# Patient Record
Sex: FEMALE | Race: WHITE | NOT HISPANIC OR LATINO | Employment: PART TIME | URBAN - METROPOLITAN AREA
[De-identification: names, ages, dates, MRNs, and addresses within clinical notes are randomized per-mention and may not be internally consistent; named-entity substitution may affect disease eponyms.]

---

## 2017-05-12 PROBLEM — Q51.3 BICORNATE UTERUS: Status: ACTIVE | Noted: 2017-05-12

## 2017-05-12 PROBLEM — Z34.90 PREGNANCY: Status: ACTIVE | Noted: 2017-05-12

## 2017-05-12 PROBLEM — Z67.10 TYPE A BLOOD, RH POSITIVE: Status: ACTIVE | Noted: 2017-05-12

## 2020-02-20 ENCOUNTER — OFFICE VISIT (OUTPATIENT)
Dept: URGENT CARE | Facility: CLINIC | Age: 30
End: 2020-02-20
Payer: MEDICAID

## 2020-02-20 VITALS
WEIGHT: 145 LBS | SYSTOLIC BLOOD PRESSURE: 152 MMHG | BODY MASS INDEX: 26.68 KG/M2 | OXYGEN SATURATION: 98 % | DIASTOLIC BLOOD PRESSURE: 82 MMHG | HEIGHT: 62 IN | HEART RATE: 107 BPM | TEMPERATURE: 98 F

## 2020-02-20 DIAGNOSIS — S93.401A SPRAIN OF RIGHT ANKLE, UNSPECIFIED LIGAMENT, INITIAL ENCOUNTER: ICD-10-CM

## 2020-02-20 DIAGNOSIS — F17.200 SMOKER: ICD-10-CM

## 2020-02-20 DIAGNOSIS — S99.911A INJURY OF RIGHT ANKLE, INITIAL ENCOUNTER: Primary | ICD-10-CM

## 2020-02-20 PROCEDURE — 99204 PR OFFICE/OUTPT VISIT, NEW, LEVL IV, 45-59 MIN: ICD-10-PCS | Mod: S$GLB,,, | Performed by: FAMILY MEDICINE

## 2020-02-20 PROCEDURE — 73630 X-RAY EXAM OF FOOT: CPT | Mod: RT,S$GLB,, | Performed by: RADIOLOGY

## 2020-02-20 PROCEDURE — 73630 XR FOOT COMPLETE 3 VIEW RIGHT: ICD-10-PCS | Mod: RT,S$GLB,, | Performed by: RADIOLOGY

## 2020-02-20 PROCEDURE — 99204 OFFICE O/P NEW MOD 45 MIN: CPT | Mod: S$GLB,,, | Performed by: FAMILY MEDICINE

## 2020-02-20 PROCEDURE — 73610 X-RAY EXAM OF ANKLE: CPT | Mod: RT,S$GLB,, | Performed by: RADIOLOGY

## 2020-02-20 PROCEDURE — 73610 XR ANKLE COMPLETE 3 VIEW RIGHT: ICD-10-PCS | Mod: RT,S$GLB,, | Performed by: RADIOLOGY

## 2020-02-20 RX ORDER — NAPROXEN 375 MG/1
375 TABLET ORAL 2 TIMES DAILY
Qty: 20 TABLET | Refills: 0 | Status: ON HOLD | OUTPATIENT
Start: 2020-02-20 | End: 2023-01-30 | Stop reason: HOSPADM

## 2020-02-20 RX ORDER — TRAMADOL HYDROCHLORIDE 50 MG/1
50 TABLET ORAL EVERY 6 HOURS PRN
Qty: 20 TABLET | Refills: 0 | Status: SHIPPED | OUTPATIENT
Start: 2020-02-20 | End: 2021-02-19

## 2020-02-20 NOTE — LETTER
February 20, 2020  Katelyn Humphrey  [address] [facility] [place] [address]                [facility]  [address], [address]  [facility] [address]  Phone: [phone]  Fax: [phone] Katelyn Humphrey was seen and treated in our Urgent Care department on 2/20/2020. She may return to work in 2 - 3 days.      If you have any questions or concerns, please don't hesitate to call.        Sincerely,        Juanpablo Fuchs MD
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
## 2020-02-20 NOTE — PATIENT INSTRUCTIONS
Please drink plenty of fluids.  Please get plenty of rest.  Please return here or go to the Emergency Department for any concerns or worsening of condition.  If you were prescribed a narcotic medication, do not drive or operate heavy equipment or machinery while taking these medications.  If you were not prescribed an anti-inflammatory medication, and if you do not have any history of stomach/intestinal ulcers, or kidney disease, or are not taking a blood thinner such as Coumadin, Plavix, Pradaxa, Eloquis, or Xaralta for example, it is OK to take over the counter Ibuprofen or Advil or Motrin or Aleve as directed.  Do not take these medications on an empty stomach.  Rest, ice, compression and elevation to the affected joint or limb as needed.    If you were given or placed in a brace, wear it until your follow up visit or recheck.  If you  smoke, please stop smoking.       If you were instructed to use crutches, use them for all ambulation.  You may walk on the injured leg with the crutches.  Use them to keep some of your weight off the injured leg until your follow up visit.    Please follow up with your primary care doctor or specialist as needed.    Primary Doctor No  None    You must understand that you have received treatment at an Urgent Care facility only, and that you may be  released before all of your medical problems are known or treated. Urgent Care facilities are not equipped to  handle life threatening emergencies. It is recommended that you seek care at an Emergency Department for  further evaluation of worsening or concerning symptoms, or possibly life threatening conditions as  discussed.      Ankle Sprain, with X-Ray   A sprain is an injury to the ligaments that hold the ankle joint together. There are no broken bones. Most sprains take about 4 to 6 weeks to heal. A severe sprain, where the ligament is completely torn, can take several months to recover.  Mild to moderate sprains may be treated with  an elastic wrap or an in-shoe splint. This will provide support and prevent re-injury. A mild sprain may not need any additional support. A severe sprain may require surgery to repair. In some cases a cast or boot may be needed.  Home care  · Stay off your injured ankle as much as possible until you can walk on it without pain. If you have a lot of pain with walking, then crutches or a walker may be prescribed. These can be rented or purchased at many pharmacies and surgical or orthopedic supply stores. Follow your healthcare providers advice about when to begin bearing weight on that leg.  · Keep your leg elevated to reduce pain and swelling. When sleeping, place a pillow under your injured ankle. When sitting, support your injured ankle and leg so they are level with your waist. This is very important during the first 48 hours.  · Place an ice pack over the injured area for no more than 20 minutes. Do this every 3 to 6 hours for the first 24 to 48 hours, or as instructed. To make an ice pack, put ice cubes in a plastic bag that seals at the top. Wrap the bag in a clean, thin towel or cloth. You can place the ice pack directly over the wrap, splint, or cast. Never place an ice pack directly on your skin. As the ice melts, be careful not to get any wrap, splint, or cast wet. Keep using ice packs as needed to ease pain and swelling.    · If you have a boot, open it to apply an ice pack, unless told otherwise by your provider. Wrap the ice pack in a clean, thin towel or cloth. Never put an ice pack directly on your skin.  · After 48 hours, it may also be helpful to apply heat for no more than 20 minutes, several times a day. You can do this with a heating pad or warm compress. Or you may want to go back and forth between using ice and heat. Never apply heat directly to the skin. Always wrap the heating pad or warm compress in a clean, thin towel or cloth.  · You may use over-the-counter pain medicine to ease pain,  unless another pain medicine was prescribed. Talk with your provider before using these medicines if you have chronic liver or kidney disease, or have ever had a stomach ulcer or GI (gastrointestinal) bleeding.  · You may return to sports after your ankle heals, when you can run without pain.  · You are at risk of getting injured again for the first 6 weeks. During that time, protect your ankle with an in-shoe splint that prevents your ankle from tilting side to side. This is very important if you do active work or play sports during that time.  Follow-up care  Any X-rays you had today dont show any broken bones, breaks, or fractures. Bruises and sprains can sometimes hurt as much as a fracture. These injuries can take time to heal completely. If your symptoms dont improve or they get worse, talk with your healthcare provider. You may need a repeat X-ray.  When to seek medical advice  Call your healthcare provider right away if any of these occur:  · The plaster cast or splint gets wet or soft  · The fiberglass cast or splint gets wet and does not dry for 24 hours  · The pain or swelling increases, or redness appears  · The cast has a bad smell  · Fever of 100.4 F (38 C) or higher, or as directed  · Your toes become cold, blue, numb, or tingly  · You re-injure your ankle  Date Last Reviewed: 11/20/2015  © 7838-9088 [employer]. [address] [place] [address]. All rights reserved. This information is not intended as a substitute for professional medical care. Always follow your healthcare professional's instructions.      Understanding Ankle Sprain    The ankle is the joint where the leg and foot meet. Bones are held in place by connective tissue called ligaments. When ankle ligaments are stretched to the point of pain and injury, it is called an ankle sprain. A sprain can tear the ligaments. These tears can be very small but still cause pain. Ankle sprains can be mild or severe.  What  causes an ankle sprain?  A sprain may occur when you twist your ankle or bend it too far. This can happen when you stumble or fall. Things that can make an ankle sprain more likely include:  · Having had an ankle sprain before  · Playing sports that involve running and jumping. Or playing contact sports such as football or hockey.  · Wearing shoes that dont support your feet and ankles well  · Having ankles with poor strength and flexibility  Symptoms of an ankle sprain  Symptoms may include:  · Pain or soreness in the ankle  · Swelling  · Redness or bruising  · Not being able to walk or put weight on the affected foot  · Reduced range of motion in the ankle  · A popping or tearing feeling at the time the sprain occurs  · An abnormal or dislocated look to the ankle  · Instability or too much range of motion in the ankle  Treatment for an ankle sprain  Treatment focuses on reducing pain and swelling, and avoiding further injury. Treatments may include:  · Resting the ankle. Avoid putting weight on it. This may mean using crutches until the sprain heals.  · Prescription or over-the-counter pain medicines. These help reduce swelling and pain.  · Cold packs. These help reduce pain and swelling.  · Raising your ankle above your heart. This helps reduce swelling.  · Wrapping the ankle with an elastic bandage or ankle brace. This helps reduce swelling and gives some support to the ankle. In rare cases, you may need a cast or boot.  · Stretching and other exercises. These improve flexibility and strength.  · Heat packs. These may be recommended before doing ankle exercises.  Possible complications of an ankle sprain  An ankle that has been weakened by a sprain can be more likely to have repeated sprains afterward. Doing exercises to strengthen your ankle and improve balance can reduce your risk for repeated sprains. Other possible complications are long-term (chronic) pain or an ankle that remains unstable.  When to call  your healthcare provider  Call your healthcare provider right away if you have any of these:  · Fever of 100.4°F (38°C) or higher, or as directed  · Pain, numbness, discoloration, or coldness in the foot or toes  · Pain that gets worse  · Symptoms that dont get better, or get worse  · New symptoms   Date Last Reviewed: 3/10/2016  © 1554-7502 [employer]. [address] [place] [address], [place] [address]. All rights reserved. This information is not intended as a substitute for professional medical care. Always follow your healthcare professional's instructions.      Discharge Instructions: Using Crutches (Weight-Bearing)  Your healthcare provider has prescribed crutches for you. A healthy leg can support your body weight, but when you have an injured leg or foot, you need to keep weight off it. Once you are told that you can put some weight on your leg, use a weight-bearing method of walking as the leg heals. Depending on your arm strength and balance, you can either step to or step through. Practice will help you learn to step through so that you can cover more ground with each step.      Before you use crutches  Be prepared with the following tips:  · Remove throw rugs, electrical cords, and anything else that may cause you to fall.  · Arrange your household to keep the items you need handy. Keep everything else out of the way.  · Find a backpack, jyotsna pack, or apron, or use pockets to carry things. This will help you keep your hands free.  Standing with crutches  Use the balanced standing (tripod) position when you start or end a movement. Also use it whenever you're standing for a length of time.  · Move your crutches in front of you about 12 inches.  · Find your balance.  · Be sure not to rest your armpits on the pads.  Walking with crutches  Follow these tips:  · Start in a balanced standing (tripod) position.  · Step forward with your affected foot.  · Land lightly between your  crutches.  · Squeeze the pads against the sides of your chest.  · Support your weight with your hands and your affected leg.  · Press down on the handgrips.  Step to  This method includes the following:  · Lift your unaffected foot and step to the crutches.  · Land on your unaffected foot, between your crutches.  · Keep the knee slightly bent.  · Reach forward and out with the crutches to begin the next step.  Step through  This method includes the following:  · Lift the unaffected foot.  · Step forward through the crutches.  · Land on the unaffected foot, with the heel slightly in front of the toe of the other foot.  · Keep the knee slightly bent.  · Reach forward and out with the crutches to begin the next step.  Follow-up  Make a follow-up appointment as directed by your healthcare provider.     When to call your healthcare provider  Call your healthcare provider right away if you have any of the following:  · Sudden or increased shortness of breath  · Sudden chest pain or localized chest pain with coughing  · Fever above 100.4°F (38.0°C)  · Increasing redness, tenderness, or swelling at theincision site or in the injured limb  · Drainage from the incision or injured limb  · Opening of the incision or injury  · Increasing pain, with or without activity   Date Last Reviewed: 8/1/2016  © 8540-7167 [email or web address]. [address] [place] [address], [place] [address]. All rights reserved. This information is not intended as a substitute for professional medical care. Always follow your healthcare professional's instructions.

## 2020-07-15 PROBLEM — Z30.2 ENCOUNTER FOR FEMALE STERILIZATION PROCEDURE: Status: ACTIVE | Noted: 2020-07-15

## 2020-11-18 ENCOUNTER — OFFICE VISIT (OUTPATIENT)
Dept: URGENT CARE | Facility: CLINIC | Age: 30
End: 2020-11-18
Payer: MEDICAID

## 2020-11-18 VITALS
BODY MASS INDEX: 26.68 KG/M2 | RESPIRATION RATE: 20 BRPM | SYSTOLIC BLOOD PRESSURE: 115 MMHG | DIASTOLIC BLOOD PRESSURE: 78 MMHG | WEIGHT: 145 LBS | TEMPERATURE: 99 F | OXYGEN SATURATION: 100 % | HEIGHT: 62 IN | HEART RATE: 70 BPM

## 2020-11-18 DIAGNOSIS — H66.003 ACUTE SUPPURATIVE OTITIS MEDIA OF BOTH EARS WITHOUT SPONTANEOUS RUPTURE OF TYMPANIC MEMBRANES, RECURRENCE NOT SPECIFIED: Primary | ICD-10-CM

## 2020-11-18 DIAGNOSIS — J06.9 ACUTE URI: ICD-10-CM

## 2020-11-18 DIAGNOSIS — Z72.0 TOBACCO ABUSE: ICD-10-CM

## 2020-11-18 PROCEDURE — 99214 OFFICE O/P EST MOD 30 MIN: CPT | Mod: S$GLB,,, | Performed by: NURSE PRACTITIONER

## 2020-11-18 PROCEDURE — 99214 PR OFFICE/OUTPT VISIT, EST, LEVL IV, 30-39 MIN: ICD-10-PCS | Mod: S$GLB,,, | Performed by: NURSE PRACTITIONER

## 2020-11-18 RX ORDER — AMOXICILLIN AND CLAVULANATE POTASSIUM 875; 125 MG/1; MG/1
1 TABLET, FILM COATED ORAL 2 TIMES DAILY
Qty: 20 TABLET | Refills: 0 | Status: SHIPPED | OUTPATIENT
Start: 2020-11-18 | End: 2020-11-28

## 2020-11-18 RX ORDER — PREDNISONE 10 MG/1
10 TABLET ORAL DAILY
Qty: 4 TABLET | Refills: 0 | Status: SHIPPED | OUTPATIENT
Start: 2020-11-18 | End: 2020-11-22

## 2020-11-18 NOTE — PATIENT INSTRUCTIONS
·   · You received a steroid oral script today - this can elevate your blood pressure, elevate your blood sugar, water weight gain, nervous energy, redness to the face and if given as an injection can cause dimpling of the skin where the shot goes in. If you are a diabetic, you should monitor your blood sugar three times daily for the next 4 days AND as needed for any signs of hyperglycemia. Go to nearest Emergency Department for any blood sugar over 300. There is a very small risk of osteonecrosis at site of injection but this is extremely rare and you should be re-evaluated if increasing pain, redness, swelling, or warmth experienced at site of injection.   ·     Middle Ear Infection (Adult)  You have an infection of the middle ear, the space behind the eardrum. This is also called acute otitis media (AOM). Sometimes it is caused by the common cold. This is because congestion can block the internal passage (eustachian tube) that drains fluid from the middle ear. When the middle ear fills with fluid, bacteria can grow there and cause an infection. Oral antibiotics are used to treat this illness, not ear drops. Symptoms usually start to improve within 1 to 2 days of treatment.    Home care  The following are general care guidelines:  Finish all of the antibiotic medicine given, even though you may feel better after the first few days.  You may use over-the-counter medicine, such as acetaminophen or ibuprofen, to control pain and fever, unless something else was prescribed. If you have chronic liver or kidney disease or have ever had a stomach ulcer or gastrointestinal bleeding, talk with your healthcare provider before using these medicines. Do not give aspirin to anyone under 18 years of age who has a fever. It may cause severe illness or death.  Follow-up care  Follow up with your healthcare provider, or as advised, in 2 weeks if all symptoms have not gotten better, or if hearing doesn't go back to normal within 1  month.  When to seek medical advice  Call your healthcare provider right away if any of these occur:  Ear pain gets worse or does not improve after 3 days of treatment  Unusual drowsiness or confusion  Neck pain, stiff neck, or headache  Fluid or blood draining from the ear canal  Fever of 100.4°F (38°C) or as advised   Seizure  Date Last Reviewed: 6/1/2016  © 5572-7739 [employer]. [address] [place] [address], [place] [address]. All rights reserved. This information is not intended as a substitute for professional medical care. Always follow your healthcare professional's instructions.      Adult Self-Care for Colds  Colds are caused by viruses. They can't be cured with antibiotics. However, you can ease symptoms and support your body's efforts to heal itself.  No matter which symptoms you have, be sure to:  Drink plenty of fluids (water or clear soup)  Stop smoking and drinking alcohol  Get plenty of rest    Understand a fever  Take your temperature several times a day. If your fever is 100.4°F (38.0°C) for more than a day, call your healthcare provider.  Relax, lie down. Go to bed if you want. Just get off your feet and rest. Also, drink plenty of fluids to avoid dehydration.  Take acetaminophen or a nonsteroidal anti-inflammatory agent (NSAID), such as ibuprofen.  Treat a troubled nose kindly  Breathe steam or heated humidified air to open blocked nasal passages.  a hot shower or use a vaporizer. Be careful not to get burned by the steam.  Saline nasal sprays and decongestant tablets help open a stuffy nose. Antihistamines can also help, but they can cause side effects such as drowsiness and drying of the eyes, nose, and mouth.  Soothe a sore throat and cough  Gargle every 2 hours with 1/4 teaspoon of salt dissolved in 1/2 cup of warm water. Suck on throat lozenges and cough drops to moisten your throat.  Cough medicines are available but it is unclear how well they actually work.  Take  acetaminophen or an NSAID, such as ibuprofen, to ease throat pain  Ease digestive problems  Put fluids back into your body. Take frequent sips of clear liquids such as water or broth. Avoid drinks that have a lot of sugar in them, such as juices and sodas. These can make diarrhea worse. Older children and adults can drink sports drinks.  As your appetite returns, you can resume your normal diet. Ask your healthcare provider if there are any foods you should avoid.  When to seek medical care  When you first notice symptoms, ask your healthcare provider if antiviral medicines are appropriate. Antibiotics should not be taken for colds or flu. Also, call your healthcare provider if you have any of the following symptoms or if you aren't feeling better after 7 days:  Shortness of breath  Pain or pressure in the chest or belly (abdomen)  Worsening symptoms, especially after a period of improvement  Fever of 100.4°F  (38.0°C) or higher, or fever that doesn't go down with medicine  Sudden dizziness or confusion  Severe or continued vomiting  Signs of dehydration, including extreme thirst, dark urine, infrequent urination, dry mouth  Spotted, red, or very sore throat   Date Last Reviewed: 12/1/2016  © 3686-3420 [employer]. [address] [place] [address]. All rights reserved. This information is not intended as a substitute for professional medical care. Always follow your healthcare professional's instructions.        ·   · Follow up with your primary care in 2-5 days if symptoms have not improved, or you may return here.  · If you were referred to a specialist, please follow up with that specialty.  · If you were prescribed antibiotics, please take them to completion.  · If you were prescribed a narcotic or any medication with sedative effects, do not drive or operate heavy equipment or machinery while taking these medications.  · You must understand that you have received treatment at an Urgent  Care facility only, and that you may be released before all of your medical problems are known or treated. Urgent Care facilities are not equipped to handle life threatening emergencies. It is recommended that you go to an Emergency Department for further evaluation of worsening or concerning symptoms, or possibly life threatening conditions as discussed.                                        If you  smoke, please stop smoking

## 2020-11-18 NOTE — PROGRESS NOTES
"Subjective:       Patient ID: Katelyn Humphrey is a 30 y.o. female.    Vitals:  height is 5' 2" (1.575 m) and weight is 65.8 kg (145 lb). Her temperature is 99 °F (37.2 °C). Her blood pressure is 115/78 and her pulse is 70. Her respiration is 20 and oxygen saturation is 100%.     Chief Complaint: Sore Throat    Sore Throat   This is a new problem. The current episode started yesterday. The problem has been unchanged. There has been no fever. The pain is at a severity of 6/10. The pain is moderate. Associated symptoms include coughing, ear pain and swollen glands. Pertinent negatives include no abdominal pain, congestion, diarrhea, drooling, ear discharge, headaches, hoarse voice, plugged ear sensation, neck pain, shortness of breath, stridor, trouble swallowing or vomiting. Associated symptoms comments:   . She has had no exposure to strep or mono. Treatments tried: chloraseptic spray. The treatment provided mild relief.       Constitution: Positive for chills. Negative for sweating, fatigue and fever.   HENT: Positive for ear pain, postnasal drip and sore throat. Negative for ear discharge, drooling, congestion, sinus pain, sinus pressure, trouble swallowing and voice change.    Neck: Negative for neck pain, neck stiffness and painful lymph nodes.   Cardiovascular: Negative for chest pain and sob on exertion.   Eyes: Negative for eye discharge and eye redness.   Respiratory: Positive for cough. Negative for chest tightness, sputum production, bloody sputum, shortness of breath, stridor, wheezing and asthma.    Gastrointestinal: Negative for abdominal pain, nausea, vomiting and diarrhea.   Genitourinary: Negative for dysuria, frequency, urgency, urine decreased, flank pain, hematuria and history of kidney stones.   Musculoskeletal: Positive for muscle ache. Negative for joint pain and joint swelling.   Skin: Negative for pale, rash, lesion and erythema.   Allergic/Immunologic: Negative for seasonal allergies and asthma. "   Neurological: Negative for headaches.   Hematologic/Lymphatic: Negative for swollen lymph nodes and easy bruising/bleeding. Does not bruise/bleed easily.       Objective:      Physical Exam   Constitutional: She is oriented to person, place, and time. She appears well-developed. She is cooperative.  Non-toxic appearance. She does not appear ill. No distress. normal  HENT:   Head: Normocephalic and atraumatic.   Ears:   Right Ear: Hearing, external ear and ear canal normal. No mastoid tenderness. Tympanic membrane is erythematous. Tympanic membrane is not bulging. A middle ear effusion is present.   Left Ear: Hearing, external ear and ear canal normal. No mastoid tenderness. Tympanic membrane is erythematous and bulging. A middle ear effusion is present.   Nose: Rhinorrhea present. No mucosal edema, purulent discharge or nasal deformity. No epistaxis. Right sinus exhibits no maxillary sinus tenderness and no frontal sinus tenderness. Left sinus exhibits no maxillary sinus tenderness and no frontal sinus tenderness.   Mouth/Throat: Uvula is midline and mucous membranes are normal. Mucous membranes are not pale. No trismus in the jaw. Normal dentition. No uvula swelling. Oropharyngeal exudate and posterior oropharyngeal erythema present. No posterior oropharyngeal edema, tonsillar abscesses or cobblestoning. Tonsils are 0 on the right. Tonsils are 0 on the left. No tonsillar exudate.   Eyes: Conjunctivae and lids are normal. Right eye exhibits no discharge. Left eye exhibits no discharge. No scleral icterus.   Neck: Trachea normal, normal range of motion, full passive range of motion without pain and phonation normal. Neck supple. No muscular tenderness present. No neck rigidity. No edema and no erythema present.   Cardiovascular: Normal rate, regular rhythm, normal heart sounds and normal pulses.   No murmur heard.  Pulmonary/Chest: Effort normal and breath sounds normal. No stridor. No respiratory distress. She has  no decreased breath sounds. She has no wheezes. She has no rhonchi.    Comments: Normal respiratory rate room air saturation.  No notable coughing at present.  No SOB/CORONADO.    Abdominal: Soft. Normal appearance. She exhibits no distension. There is no abdominal tenderness. There is no guarding.   Musculoskeletal: Normal range of motion.         General: No deformity.   Lymphadenopathy:     She has cervical adenopathy.   Neurological: She is alert and oriented to person, place, and time. She exhibits normal muscle tone. Coordination and gait normal.   Skin: Skin is warm, dry, intact, not diaphoretic and not pale. erythemajaundicePsychiatric: Her speech is normal and behavior is normal. Judgment and thought content normal.   Nursing note and vitals reviewed.        Assessment:       1. Acute suppurative otitis media of both ears without spontaneous rupture of tympanic membranes, recurrence not specified    2. Acute URI        Plan:       Alert, nontoxic and in NAD. Afebrile. Pt with acute uri,OM. Based on exam and hpi, no concern for pneumonia or bronchitis.  Patient denies COVID exposure, declines testing.  Advised on treatment for otitis media at home  Symptomatic management. Educated on s/s to return to clinic, seek emergency care. Verbalizes understanding and agreement with treatment plan.     Acute suppurative otitis media of both ears without spontaneous rupture of tympanic membranes, recurrence not specified  -     predniSONE (DELTASONE) 10 MG tablet; Take 1 tablet (10 mg total) by mouth once daily. for 4 days  Dispense: 4 tablet; Refill: 0  -     amoxicillin-clavulanate 875-125mg (AUGMENTIN) 875-125 mg per tablet; Take 1 tablet by mouth 2 (two) times daily. for 10 days  Dispense: 20 tablet; Refill: 0    Acute URI  -     predniSONE (DELTASONE) 10 MG tablet; Take 1 tablet (10 mg total) by mouth once daily. for 4 days  Dispense: 4 tablet; Refill: 0          Patient Instructions     ·   · You received a steroid  oral script today - this can elevate your blood pressure, elevate your blood sugar, water weight gain, nervous energy, redness to the face and if given as an injection can cause dimpling of the skin where the shot goes in. If you are a diabetic, you should monitor your blood sugar three times daily for the next 4 days AND as needed for any signs of hyperglycemia. Go to nearest Emergency Department for any blood sugar over 300. There is a very small risk of osteonecrosis at site of injection but this is extremely rare and you should be re-evaluated if increasing pain, redness, swelling, or warmth experienced at site of injection.   ·     Middle Ear Infection (Adult)  You have an infection of the middle ear, the space behind the eardrum. This is also called acute otitis media (AOM). Sometimes it is caused by the common cold. This is because congestion can block the internal passage (eustachian tube) that drains fluid from the middle ear. When the middle ear fills with fluid, bacteria can grow there and cause an infection. Oral antibiotics are used to treat this illness, not ear drops. Symptoms usually start to improve within 1 to 2 days of treatment.    Home care  The following are general care guidelines:  Finish all of the antibiotic medicine given, even though you may feel better after the first few days.  You may use over-the-counter medicine, such as acetaminophen or ibuprofen, to control pain and fever, unless something else was prescribed. If you have chronic liver or kidney disease or have ever had a stomach ulcer or gastrointestinal bleeding, talk with your healthcare provider before using these medicines. Do not give aspirin to anyone under 18 years of age who has a fever. It may cause severe illness or death.  Follow-up care  Follow up with your healthcare provider, or as advised, in 2 weeks if all symptoms have not gotten better, or if hearing doesn't go back to normal within 1 month.  When to seek medical  advice  Call your healthcare provider right away if any of these occur:  Ear pain gets worse or does not improve after 3 days of treatment  Unusual drowsiness or confusion  Neck pain, stiff neck, or headache  Fluid or blood draining from the ear canal  Fever of 100.4°F (38°C) or as advised   Seizure  Date Last Reviewed: 6/1/2016  © 0718-1572 [employer]. [address] [place] [address], [place] [address]. All rights reserved. This information is not intended as a substitute for professional medical care. Always follow your healthcare professional's instructions.      Adult Self-Care for Colds  Colds are caused by viruses. They can't be cured with antibiotics. However, you can ease symptoms and support your body's efforts to heal itself.  No matter which symptoms you have, be sure to:  Drink plenty of fluids (water or clear soup)  Stop smoking and drinking alcohol  Get plenty of rest    Understand a fever  Take your temperature several times a day. If your fever is 100.4°F (38.0°C) for more than a day, call your healthcare provider.  Relax, lie down. Go to bed if you want. Just get off your feet and rest. Also, drink plenty of fluids to avoid dehydration.  Take acetaminophen or a nonsteroidal anti-inflammatory agent (NSAID), such as ibuprofen.  Treat a troubled nose kindly  Breathe steam or heated humidified air to open blocked nasal passages.  a hot shower or use a vaporizer. Be careful not to get burned by the steam.  Saline nasal sprays and decongestant tablets help open a stuffy nose. Antihistamines can also help, but they can cause side effects such as drowsiness and drying of the eyes, nose, and mouth.  Soothe a sore throat and cough  Gargle every 2 hours with 1/4 teaspoon of salt dissolved in 1/2 cup of warm water. Suck on throat lozenges and cough drops to moisten your throat.  Cough medicines are available but it is unclear how well they actually work.  Take acetaminophen or an NSAID, such as  ibuprofen, to ease throat pain  Ease digestive problems  Put fluids back into your body. Take frequent sips of clear liquids such as water or broth. Avoid drinks that have a lot of sugar in them, such as juices and sodas. These can make diarrhea worse. Older children and adults can drink sports drinks.  As your appetite returns, you can resume your normal diet. Ask your healthcare provider if there are any foods you should avoid.  When to seek medical care  When you first notice symptoms, ask your healthcare provider if antiviral medicines are appropriate. Antibiotics should not be taken for colds or flu. Also, call your healthcare provider if you have any of the following symptoms or if you aren't feeling better after 7 days:  Shortness of breath  Pain or pressure in the chest or belly (abdomen)  Worsening symptoms, especially after a period of improvement  Fever of 100.4°F  (38.0°C) or higher, or fever that doesn't go down with medicine  Sudden dizziness or confusion  Severe or continued vomiting  Signs of dehydration, including extreme thirst, dark urine, infrequent urination, dry mouth  Spotted, red, or very sore throat   Date Last Reviewed: 12/1/2016  © 7369-9475 [employer]. [address] [place] [address]. All rights reserved. This information is not intended as a substitute for professional medical care. Always follow your healthcare professional's instructions.        ·   · Follow up with your primary care in 2-5 days if symptoms have not improved, or you may return here.  · If you were referred to a specialist, please follow up with that specialty.  · If you were prescribed antibiotics, please take them to completion.  · If you were prescribed a narcotic or any medication with sedative effects, do not drive or operate heavy equipment or machinery while taking these medications.  · You must understand that you have received treatment at an Urgent Care facility only, and that you may  be released before all of your medical problems are known or treated. Urgent Care facilities are not equipped to handle life threatening emergencies. It is recommended that you go to an Emergency Department for further evaluation of worsening or concerning symptoms, or possibly life threatening conditions as discussed.                                        If you  smoke, please stop smoking

## 2021-05-06 ENCOUNTER — PATIENT MESSAGE (OUTPATIENT)
Dept: RESEARCH | Facility: HOSPITAL | Age: 31
End: 2021-05-06

## 2021-10-18 ENCOUNTER — OFFICE VISIT (OUTPATIENT)
Dept: URGENT CARE | Facility: CLINIC | Age: 31
End: 2021-10-18
Payer: MEDICAID

## 2021-10-18 VITALS
SYSTOLIC BLOOD PRESSURE: 127 MMHG | OXYGEN SATURATION: 98 % | DIASTOLIC BLOOD PRESSURE: 82 MMHG | BODY MASS INDEX: 22.08 KG/M2 | TEMPERATURE: 97 F | HEART RATE: 91 BPM | HEIGHT: 62 IN | RESPIRATION RATE: 16 BRPM | WEIGHT: 120 LBS

## 2021-10-18 DIAGNOSIS — K52.9 GASTROENTERITIS: Primary | ICD-10-CM

## 2021-10-18 PROCEDURE — 99213 PR OFFICE/OUTPT VISIT, EST, LEVL III, 20-29 MIN: ICD-10-PCS | Mod: S$GLB,,, | Performed by: PHYSICIAN ASSISTANT

## 2021-10-18 PROCEDURE — 99213 OFFICE O/P EST LOW 20 MIN: CPT | Mod: S$GLB,,, | Performed by: PHYSICIAN ASSISTANT

## 2021-10-18 RX ORDER — ONDANSETRON 4 MG/1
4 TABLET, ORALLY DISINTEGRATING ORAL EVERY 8 HOURS PRN
Qty: 8 TABLET | Refills: 0 | Status: SHIPPED | OUTPATIENT
Start: 2021-10-18

## 2021-10-18 RX ORDER — HYOSCYAMINE SULFATE 0.125 MG
125 TABLET ORAL EVERY 4 HOURS PRN
Qty: 12 TABLET | Refills: 0 | Status: SHIPPED | OUTPATIENT
Start: 2021-10-18

## 2023-01-29 PROBLEM — S60.519A ABRASION, HAND: Status: ACTIVE | Noted: 2023-01-29

## 2023-01-30 PROBLEM — L03.011 CELLULITIS OF FINGER OF RIGHT HAND: Status: ACTIVE | Noted: 2023-01-30

## 2023-01-30 PROBLEM — S61.259A HUMAN BITE OF FINGER: Status: ACTIVE | Noted: 2023-01-30
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
## 2023-08-13 ENCOUNTER — HOSPITAL ENCOUNTER (EMERGENCY)
Facility: HOSPITAL | Age: 33
Discharge: LEFT BEFORE DISCHARGE | End: 2023-08-13
Attending: STUDENT IN AN ORGANIZED HEALTH CARE EDUCATION/TRAINING PROGRAM
Payer: MEDICAID

## 2023-08-13 VITALS
HEART RATE: 90 BPM | HEIGHT: 62 IN | OXYGEN SATURATION: 97 % | WEIGHT: 150 LBS | BODY MASS INDEX: 27.6 KG/M2 | TEMPERATURE: 98 F | DIASTOLIC BLOOD PRESSURE: 98 MMHG | SYSTOLIC BLOOD PRESSURE: 148 MMHG | RESPIRATION RATE: 20 BRPM

## 2023-08-13 DIAGNOSIS — Z53.21 ELOPED FROM EMERGENCY DEPARTMENT: Primary | ICD-10-CM

## 2023-08-13 PROCEDURE — 99282 EMERGENCY DEPT VISIT SF MDM: CPT

## 2023-08-14 NOTE — ED PROVIDER NOTES
Encounter Date: 8/13/2023       History     Chief Complaint   Patient presents with    Insect Bite     Reports multiple insect bites approximately 12 hours ago. Was seen at [facility] for the same complaint earlier today. Denies any fevers.      PATIENT LEFT THE ED BEFORE BEING ROOMED        Review of patient's allergies indicates:  No Known Allergies  History reviewed. No pertinent past medical history.  No past surgical history on file.  Family History   Problem Relation Age of Onset    No Known Problems Mother     No Known Problems Father      Social History     Tobacco Use    Smoking status: Every Day     Current packs/day: 1.00     Average packs/day: 1 pack/day for 5.0 years (5.0 ttl pk-yrs)     Types: Cigarettes    Smokeless tobacco: Never   Substance Use Topics    Alcohol use: Yes     Comment: daily 2-3 beers    Drug use: Yes     Types: Marijuana     Review of Systems    Physical Exam     Initial Vitals [08/13/23 2135]   BP Pulse Resp Temp SpO2   (!) 148/98 90 20 98 °F (36.7 °C) 97 %      MAP       --         Physical Exam    ED Course   Procedures  Labs Reviewed - No data to display       Imaging Results    None          Medications - No data to display                           Clinical Impression:   Final diagnoses:  [Z53.21] Eloped from emergency department (Primary)        ED Disposition Condition    [place]                 Seth Verdugo DO  08/13/23 3015

## 2025-02-28 ENCOUNTER — OFFICE VISIT (OUTPATIENT)
Dept: URGENT CARE | Facility: CLINIC | Age: 35
End: 2025-02-28
Payer: MEDICAID

## 2025-02-28 VITALS
DIASTOLIC BLOOD PRESSURE: 87 MMHG | SYSTOLIC BLOOD PRESSURE: 127 MMHG | TEMPERATURE: 98 F | BODY MASS INDEX: 23.92 KG/M2 | HEART RATE: 82 BPM | HEIGHT: 62 IN | RESPIRATION RATE: 17 BRPM | WEIGHT: 130 LBS | OXYGEN SATURATION: 99 %

## 2025-02-28 DIAGNOSIS — J01.90 ACUTE SINUSITIS, RECURRENCE NOT SPECIFIED, UNSPECIFIED LOCATION: ICD-10-CM

## 2025-02-28 DIAGNOSIS — H65.93 OTITIS MEDIA WITH EFFUSION, BILATERAL: Primary | ICD-10-CM

## 2025-02-28 DIAGNOSIS — R05.9 COUGH, UNSPECIFIED TYPE: ICD-10-CM

## 2025-02-28 LAB
CTP QC/QA: YES
POC MOLECULAR INFLUENZA A AGN: NEGATIVE
POC MOLECULAR INFLUENZA B AGN: NEGATIVE

## 2025-02-28 RX ORDER — AMOXICILLIN AND CLAVULANATE POTASSIUM 875; 125 MG/1; MG/1
1 TABLET, FILM COATED ORAL EVERY 12 HOURS
Qty: 20 TABLET | Refills: 0 | Status: SHIPPED | OUTPATIENT
Start: 2025-02-28 | End: 2025-03-10

## 2025-02-28 RX ORDER — BENZONATATE 200 MG/1
200 CAPSULE ORAL 3 TIMES DAILY PRN
Qty: 21 CAPSULE | Refills: 0 | Status: SHIPPED | OUTPATIENT
Start: 2025-02-28 | End: 2025-03-07

## 2025-02-28 RX ORDER — IPRATROPIUM BROMIDE 21 UG/1
1 SPRAY, METERED NASAL 2 TIMES DAILY
Qty: 30 ML | Refills: 0 | Status: SHIPPED | OUTPATIENT
Start: 2025-02-28 | End: 2025-03-04

## 2025-02-28 RX ORDER — LEVOCETIRIZINE DIHYDROCHLORIDE 5 MG/1
5 TABLET, FILM COATED ORAL NIGHTLY
Qty: 30 TABLET | Refills: 0 | Status: SHIPPED | OUTPATIENT
Start: 2025-02-28 | End: 2026-02-28

## 2025-02-28 NOTE — PROGRESS NOTES
"Subjective:      Patient ID: Katelyn Humphrey is a 34 y.o. female.    Vitals:  height is 5' 2" (1.575 m) and weight is 59 kg (130 lb). Her oral temperature is 98.4 °F (36.9 °C). Her blood pressure is 127/87 and her pulse is 82. Her respiration is 17 and oxygen saturation is 99%.     Chief Complaint: Cough    Pt reports post nasal drainage, cough, otalgia, for almost a week. Stated her son recently had the flu. Denies fever.     Cough  This is a new problem. Episode onset: 1 week\ The problem has been gradually worsening. The problem occurs every few minutes. The cough is Productive of sputum. Associated symptoms include ear pain (both ears), headaches, nasal congestion, a sore throat and sweats. Pertinent negatives include no chills or fever. Associated symptoms comments: Runny nose, hoarse voice  . Treatments tried: thera flu. The treatment provided moderate relief. There is no history of asthma, bronchitis or pneumonia.       Constitution: Negative for chills and fever.   HENT:  Positive for ear pain (both ears) and sore throat.    Respiratory:  Positive for cough.    Neurological:  Positive for headaches.      Objective:     Physical Exam   Constitutional:  Non-toxic appearance. She does not appear ill. No distress.   HENT:   Head: Normocephalic and atraumatic.   Ears:   Right Ear: External ear and ear canal normal. A middle ear effusion is present.   Left Ear: External ear and ear canal normal. A middle ear effusion is present.   Nose: No rhinorrhea or congestion.   Mouth/Throat: Uvula is midline. Mucous membranes are moist. Posterior oropharyngeal erythema present. No posterior oropharyngeal edema. Oropharynx is clear.   Suppurative effusions bilaterally.       Comments: Suppurative effusions bilaterally.   Eyes: Conjunctivae are normal.   Neck: Neck supple. No neck rigidity present.   Cardiovascular: Normal rate and regular rhythm.   Pulmonary/Chest: Effort normal. No respiratory distress. She has no " wheezes.   Abdominal: Normal appearance.   Neurological: no focal deficit. She is alert.   Skin: Skin is warm, dry and not diaphoretic.   Psychiatric: Her behavior is normal. Judgment and thought content normal.   Nursing note and vitals reviewed.    Results for orders placed or performed in visit on 02/28/25   POCT Influenza A/B MOLECULAR    Collection Time: 02/28/25  5:32 PM   Result Value Ref Range    POC Molecular Influenza A Ag Negative Negative    POC Molecular Influenza B Ag Negative Negative     Acceptable Yes          Assessment:     1. Otitis media with effusion, bilateral    2. Cough, unspecified type    3. Acute sinusitis, recurrence not specified, unspecified location        Plan:       Otitis media with effusion, bilateral  -     amoxicillin-clavulanate 875-125mg (AUGMENTIN) 875-125 mg per tablet; Take 1 tablet by mouth every 12 (twelve) hours. for 10 days  Dispense: 20 tablet; Refill: 0    Cough, unspecified type  -     POCT Influenza A/B MOLECULAR  -     benzonatate (TESSALON) 200 MG capsule; Take 1 capsule (200 mg total) by mouth 3 (three) times daily as needed for Cough.  Dispense: 21 capsule; Refill: 0    Acute sinusitis, recurrence not specified, unspecified location  -     amoxicillin-clavulanate 875-125mg (AUGMENTIN) 875-125 mg per tablet; Take 1 tablet by mouth every 12 (twelve) hours. for 10 days  Dispense: 20 tablet; Refill: 0  -     ipratropium (ATROVENT) 21 mcg (0.03 %) nasal spray; 1 spray by Each Nostril route 2 (two) times daily. for 4 days  Dispense: 30 mL; Refill: 0  -     levocetirizine (XYZAL) 5 MG tablet; Take 1 tablet (5 mg total) by mouth every evening.  Dispense: 30 tablet; Refill: 0      Flu is negative, discussed results with patient.      Please drink plenty of fluids.  Please get plenty of rest.  Please return here or go to the Emergency Department for any concerns or worsening of condition.  If you were prescribed antibiotics, please take them to  completion.  If you do not have Hypertension or any history of palpitations, it is ok to take over the counter Sudafed or Mucinex D or Allegra-D or Claritin-D or Zyrtec-D.  If you do take one of the above, it is ok to combine that with plain over the counter Mucinex or Allegra or Claritin or Zyrtec.  If for example you are taking Zyrtec -D, you can combine that with Mucinex, but not Mucinex-D.  If you are taking Mucinex-D, you can combine that with plain Allegra or Claritin or Zyrtec.   If you do have Hypertension or palpitations, it is safe to take Coricidin HBP for relief of sinus symptoms.  If not allergic, please take over the counter Tylenol (Acetaminophen) and/or Motrin (Ibuprofen) as directed for control of pain and/or fever.  Please follow up with your primary care doctor or specialist as needed.    If you  smoke, please stop smoking.  Patient Instructions   1.  Take all medications as directed. If you have been prescribed antibiotics, make sure to complete them.   2.  Rest and keep yourself/patient well hydrated. For adults, it is recommended to drink at least 8-10 glasses of water daily.   3.  For patients above 6 months of age who are not allergic to and are not on anticoagulants, you can alternate Tylenol and Motrin every 4-6 hours for fever above 100.4F and/or pain.  For patients less than 6 months of age, allergic to or intolerant to NSAIDS, have gastritis, gastric ulcers, or history of GI bleeds, are pregnant, or are on anticoagulant therapy, you can take Tylenol every 4 hours as needed for fever above 100.4F and/or pain.   4. You should schedule a follow-up appointment with your Primary Care Provider/Pediatrician for recheck in 2-3 days or as directed at this visit.   5.  If your condition fails to improve in a timely manner, you should receive another evaluation by your Primary Care Provider/Pediatrician to discuss your concerns or return to urgent care for a recheck.  If your condition worsens at  any time, you should report immediately to your nearest Emergency Department for further evaluation. **You must understand that you have received Urgent Care treatment only and that you may be released before all of your medical problems are known or treated. You, the patient, are responsible to arrange for follow-up care as instructed.

## 2025-02-28 NOTE — LETTER
February 28, 2025      [facility] [place]  [address]  [place] [address]  Phone: [phone]  Fax: [phone]       Patient: Katelyn Humphrey   YOB: 1990  Date of Visit: 02/28/2025    To Whom It May Concern:    Srikanth Humphrey  was at [facility] on 02/28/2025. The patient may return to work/school in 1-2 days with no restrictions. If you have any questions or concerns, or if I can be of further assistance, please do not hesitate to contact me.    Sincerely,    Isabela Blanco PA-C